# Patient Record
(demographics unavailable — no encounter records)

---

## 2024-11-04 NOTE — ASSESSMENT
[FreeTextEntry1] : 41 year old male with changes in bowel habits and bleeding hemorrhoids. recommend , high fiber diet, metamucil daily.  Follow-up in 4 months

## 2024-11-04 NOTE — PHYSICAL EXAM
[Abdomen Masses] : No abdominal masses [Abdomen Tenderness] : ~T No ~M abdominal tenderness [Tender] : nontender [Normal rectal exam] : exam was normal [Fistula] : no fistulas [Tender, Swollen] : tender, swollen [Normal] : was normal [None] : there was no rectal mass  [JVD] : no jugular venous distention  [Normal Breath Sounds] : Normal breath sounds [Normal Heart Sounds] : normal heart sounds [Normal Rate and Rhythm] : normal rate and rhythm [No Rash or Lesion] : No rash or lesion [Alert] : alert [Oriented to Place] : oriented to place [Oriented to Person] : oriented to person [Oriented to Time] : oriented to time [Calm] : calm [de-identified] : well healed fistulotomy site [de-identified] : Looks well in no distress, of stated age. [de-identified] : pupils equal reactive to light normocephalic atraumatic. [de-identified] : moves all 4 extremities appropriately with 5 over 5 strength